# Patient Record
Sex: MALE | Race: ASIAN | URBAN - METROPOLITAN AREA
[De-identification: names, ages, dates, MRNs, and addresses within clinical notes are randomized per-mention and may not be internally consistent; named-entity substitution may affect disease eponyms.]

---

## 2020-10-12 ENCOUNTER — TELEPHONE (OUTPATIENT)
Dept: FAMILY MEDICINE | Facility: CLINIC | Age: 30
End: 2020-10-12

## 2020-10-12 DIAGNOSIS — Z11.59 ENCOUNTER FOR SCREENING FOR OTHER VIRAL DISEASES: Primary | ICD-10-CM

## 2020-10-12 NOTE — TELEPHONE ENCOUNTER
"Patient is requesting COVID-19 PCR testing. They are currently asymptomatic, and meet the following criteria for testing per the July 14, 2020 [facility] testing guidelines: 7/14 Asymptomatic criteria: is traveling to locations that require a negative COVID-19 PCR for entry. Patient is traveling to [place] on Saturday 10/17 and requires COVID test for entry.     If patient has had close contact (within 6 ft for >/= 15 min), recommend patient gets PCR testing between days 5-7 after exposure. Also recommend 14 day quarantine per MD:     \"All close contacts should follow a 14-day quarantine period. CDC recommends that close contacts be PCR-tested for COVID-19. Even if the result is negative, these contacts should continue to quarantine for a full 14 days after last exposure and monitor for symptoms; infection could develop at any time during the quarantine period. Repeat testing at the end of the quarantine period may be recommended for staff or residents of congregate settings; refer to setting-specific testing guidance for additional information. Specific guidance is available for health care workers who have a health care exposure and these individuals should follow that guidance; it would apply if they have a community/household contact.\"  ([email or web address]):    Patient transferred to  to schedule 20 min test only (no charge) visit with [facility] provider. Order placed under [facility] preceptor. RN copied on results. Route chart to [facility] provider for FYI, reminder to do no charge visit. ./LR        "

## 2020-10-14 VITALS
SYSTOLIC BLOOD PRESSURE: 137 MMHG | HEART RATE: 90 BPM | DIASTOLIC BLOOD PRESSURE: 77 MMHG | OXYGEN SATURATION: 97 % | TEMPERATURE: 98.2 F | RESPIRATION RATE: 16 BRPM

## 2020-10-14 DIAGNOSIS — Z11.59 ENCOUNTER FOR SCREENING FOR OTHER VIRAL DISEASES: ICD-10-CM

## 2020-10-14 LAB
COVID-19 VIRUS PCR TO U OF MN - SOURCE: NORMAL
SARS-COV-2 RNA SPEC QL NAA+PROBE: NOT DETECTED

## 2020-10-14 PROCEDURE — 87635 SARS-COV-2 COVID-19 AMP PRB: CPT | Performed by: STUDENT IN AN ORGANIZED HEALTH CARE EDUCATION/TRAINING PROGRAM

## 2021-01-09 ENCOUNTER — HEALTH MAINTENANCE LETTER (OUTPATIENT)
Age: 31
End: 2021-01-09

## 2021-10-11 ENCOUNTER — HEALTH MAINTENANCE LETTER (OUTPATIENT)
Age: 31
End: 2021-10-11

## 2022-01-30 ENCOUNTER — HEALTH MAINTENANCE LETTER (OUTPATIENT)
Age: 32
End: 2022-01-30

## 2022-06-03 NOTE — TELEPHONE ENCOUNTER
[facility] phone call message- general phone call:    Reason for call: him and a friend are traveling out of state and they need to be tested for covid.    Action desired: call back.    Return call needed: Yes    OK to leave a message on voice mail? Yes    Advised patient to response may take up to 2 business days: Yes    Primary language: Data Unavailable      needed? Data Unavailable    Call taken on October 12, 2020 at 12:14 PM by Lindy Gilliam   none

## 2022-09-24 ENCOUNTER — HEALTH MAINTENANCE LETTER (OUTPATIENT)
Age: 32
End: 2022-09-24

## 2023-05-08 ENCOUNTER — HEALTH MAINTENANCE LETTER (OUTPATIENT)
Age: 33
End: 2023-05-08